# Patient Record
Sex: FEMALE | Race: WHITE | NOT HISPANIC OR LATINO | URBAN - METROPOLITAN AREA
[De-identification: names, ages, dates, MRNs, and addresses within clinical notes are randomized per-mention and may not be internally consistent; named-entity substitution may affect disease eponyms.]

---

## 2019-10-09 ENCOUNTER — INPATIENT (INPATIENT)
Facility: HOSPITAL | Age: 39
LOS: 3 days | Discharge: HOME | End: 2019-10-13
Attending: INTERNAL MEDICINE | Admitting: INTERNAL MEDICINE
Payer: MEDICAID

## 2019-10-09 VITALS
WEIGHT: 205.03 LBS | TEMPERATURE: 101 F | OXYGEN SATURATION: 97 % | HEART RATE: 112 BPM | SYSTOLIC BLOOD PRESSURE: 117 MMHG | HEIGHT: 62.99 IN | DIASTOLIC BLOOD PRESSURE: 61 MMHG | RESPIRATION RATE: 18 BRPM

## 2019-10-09 LAB
ALBUMIN SERPL ELPH-MCNC: 4.1 G/DL — SIGNIFICANT CHANGE UP (ref 3.5–5.2)
ALP SERPL-CCNC: 65 U/L — SIGNIFICANT CHANGE UP (ref 30–115)
ALT FLD-CCNC: 31 U/L — SIGNIFICANT CHANGE UP (ref 0–41)
ANION GAP SERPL CALC-SCNC: 13 MMOL/L — SIGNIFICANT CHANGE UP (ref 7–14)
APPEARANCE UR: ABNORMAL
AST SERPL-CCNC: 38 U/L — SIGNIFICANT CHANGE UP (ref 0–41)
BACTERIA # UR AUTO: ABNORMAL
BASOPHILS # BLD AUTO: 0.01 K/UL — SIGNIFICANT CHANGE UP (ref 0–0.2)
BASOPHILS NFR BLD AUTO: 0.1 % — SIGNIFICANT CHANGE UP (ref 0–1)
BILIRUB SERPL-MCNC: 2.7 MG/DL — HIGH (ref 0.2–1.2)
BILIRUB UR-MCNC: NEGATIVE — SIGNIFICANT CHANGE UP
BUN SERPL-MCNC: 9 MG/DL — LOW (ref 10–20)
CALCIUM SERPL-MCNC: 9.1 MG/DL — SIGNIFICANT CHANGE UP (ref 8.5–10.1)
CHLORIDE SERPL-SCNC: 96 MMOL/L — LOW (ref 98–110)
CK SERPL-CCNC: 65 U/L — SIGNIFICANT CHANGE UP (ref 0–225)
CO2 SERPL-SCNC: 24 MMOL/L — SIGNIFICANT CHANGE UP (ref 17–32)
COLOR SPEC: YELLOW — SIGNIFICANT CHANGE UP
CREAT SERPL-MCNC: 1 MG/DL — SIGNIFICANT CHANGE UP (ref 0.7–1.5)
DIFF PNL FLD: ABNORMAL
EOSINOPHIL # BLD AUTO: 0 K/UL — SIGNIFICANT CHANGE UP (ref 0–0.7)
EOSINOPHIL NFR BLD AUTO: 0 % — SIGNIFICANT CHANGE UP (ref 0–8)
EPI CELLS # UR: 7 /HPF — HIGH (ref 0–5)
FLU A RESULT: NEGATIVE — SIGNIFICANT CHANGE UP
FLU A RESULT: NEGATIVE — SIGNIFICANT CHANGE UP
FLUAV AG NPH QL: NEGATIVE — SIGNIFICANT CHANGE UP
FLUBV AG NPH QL: NEGATIVE — SIGNIFICANT CHANGE UP
GLUCOSE SERPL-MCNC: 118 MG/DL — HIGH (ref 70–99)
GLUCOSE UR QL: NEGATIVE — SIGNIFICANT CHANGE UP
HCG SERPL QL: NEGATIVE — SIGNIFICANT CHANGE UP
HCT VFR BLD CALC: 39.8 % — SIGNIFICANT CHANGE UP (ref 37–47)
HGB BLD-MCNC: 13.5 G/DL — SIGNIFICANT CHANGE UP (ref 12–16)
HYALINE CASTS # UR AUTO: 3 /LPF — SIGNIFICANT CHANGE UP (ref 0–7)
IMM GRANULOCYTES NFR BLD AUTO: 0.4 % — HIGH (ref 0.1–0.3)
KETONES UR-MCNC: ABNORMAL
LACTATE SERPL-SCNC: 1.3 MMOL/L — SIGNIFICANT CHANGE UP (ref 0.5–2.2)
LEUKOCYTE ESTERASE UR-ACNC: ABNORMAL
LIDOCAIN IGE QN: 18 U/L — SIGNIFICANT CHANGE UP (ref 7–60)
LYMPHOCYTES # BLD AUTO: 1.09 K/UL — LOW (ref 1.2–3.4)
LYMPHOCYTES # BLD AUTO: 7.4 % — LOW (ref 20.5–51.1)
MAGNESIUM SERPL-MCNC: 1.6 MG/DL — LOW (ref 1.8–2.4)
MCHC RBC-ENTMCNC: 30.2 PG — SIGNIFICANT CHANGE UP (ref 27–31)
MCHC RBC-ENTMCNC: 33.9 G/DL — SIGNIFICANT CHANGE UP (ref 32–37)
MCV RBC AUTO: 89 FL — SIGNIFICANT CHANGE UP (ref 81–99)
MONOCYTES # BLD AUTO: 1.27 K/UL — HIGH (ref 0.1–0.6)
MONOCYTES NFR BLD AUTO: 8.6 % — SIGNIFICANT CHANGE UP (ref 1.7–9.3)
NEUTROPHILS # BLD AUTO: 12.35 K/UL — HIGH (ref 1.4–6.5)
NEUTROPHILS NFR BLD AUTO: 83.5 % — HIGH (ref 42.2–75.2)
NITRITE UR-MCNC: POSITIVE
NRBC # BLD: 0 /100 WBCS — SIGNIFICANT CHANGE UP (ref 0–0)
PH UR: 6.5 — SIGNIFICANT CHANGE UP (ref 5–8)
PLATELET # BLD AUTO: 205 K/UL — SIGNIFICANT CHANGE UP (ref 130–400)
POTASSIUM SERPL-MCNC: 3.8 MMOL/L — SIGNIFICANT CHANGE UP (ref 3.5–5)
POTASSIUM SERPL-SCNC: 3.8 MMOL/L — SIGNIFICANT CHANGE UP (ref 3.5–5)
PROT SERPL-MCNC: 7.5 G/DL — SIGNIFICANT CHANGE UP (ref 6–8)
PROT UR-MCNC: ABNORMAL
RBC # BLD: 4.47 M/UL — SIGNIFICANT CHANGE UP (ref 4.2–5.4)
RBC # FLD: 13.2 % — SIGNIFICANT CHANGE UP (ref 11.5–14.5)
RBC CASTS # UR COMP ASSIST: 13 /HPF — HIGH (ref 0–4)
RSV RESULT: NEGATIVE — SIGNIFICANT CHANGE UP
RSV RNA RESP QL NAA+PROBE: NEGATIVE — SIGNIFICANT CHANGE UP
SODIUM SERPL-SCNC: 133 MMOL/L — LOW (ref 135–146)
SP GR SPEC: 1.01 — LOW (ref 1.01–1.02)
UROBILINOGEN FLD QL: SIGNIFICANT CHANGE UP
WBC # BLD: 14.78 K/UL — HIGH (ref 4.8–10.8)
WBC # FLD AUTO: 14.78 K/UL — HIGH (ref 4.8–10.8)
WBC UR QL: 157 /HPF — HIGH (ref 0–5)

## 2019-10-09 PROCEDURE — 71046 X-RAY EXAM CHEST 2 VIEWS: CPT | Mod: 26

## 2019-10-09 PROCEDURE — 74177 CT ABD & PELVIS W/CONTRAST: CPT | Mod: 26

## 2019-10-09 PROCEDURE — 99285 EMERGENCY DEPT VISIT HI MDM: CPT

## 2019-10-09 RX ORDER — SODIUM CHLORIDE 9 MG/ML
1000 INJECTION INTRAMUSCULAR; INTRAVENOUS; SUBCUTANEOUS ONCE
Refills: 0 | Status: COMPLETED | OUTPATIENT
Start: 2019-10-09 | End: 2019-10-09

## 2019-10-09 RX ORDER — CEFTRIAXONE 500 MG/1
1000 INJECTION, POWDER, FOR SOLUTION INTRAMUSCULAR; INTRAVENOUS EVERY 24 HOURS
Refills: 0 | Status: DISCONTINUED | OUTPATIENT
Start: 2019-10-09 | End: 2019-10-13

## 2019-10-09 RX ORDER — MAGNESIUM SULFATE 500 MG/ML
2 VIAL (ML) INJECTION ONCE
Refills: 0 | Status: COMPLETED | OUTPATIENT
Start: 2019-10-09 | End: 2019-10-09

## 2019-10-09 RX ORDER — ACETAMINOPHEN 500 MG
650 TABLET ORAL EVERY 6 HOURS
Refills: 0 | Status: DISCONTINUED | OUTPATIENT
Start: 2019-10-09 | End: 2019-10-10

## 2019-10-09 RX ORDER — ACETAMINOPHEN 500 MG
650 TABLET ORAL EVERY 6 HOURS
Refills: 0 | Status: DISCONTINUED | OUTPATIENT
Start: 2019-10-09 | End: 2019-10-13

## 2019-10-09 RX ORDER — IBUPROFEN 200 MG
600 TABLET ORAL EVERY 8 HOURS
Refills: 0 | Status: DISCONTINUED | OUTPATIENT
Start: 2019-10-09 | End: 2019-10-09

## 2019-10-09 RX ORDER — CHLORHEXIDINE GLUCONATE 213 G/1000ML
1 SOLUTION TOPICAL
Refills: 0 | Status: DISCONTINUED | OUTPATIENT
Start: 2019-10-09 | End: 2019-10-13

## 2019-10-09 RX ORDER — IBUPROFEN 200 MG
600 TABLET ORAL EVERY 8 HOURS
Refills: 0 | Status: DISCONTINUED | OUTPATIENT
Start: 2019-10-09 | End: 2019-10-13

## 2019-10-09 RX ORDER — KETOROLAC TROMETHAMINE 30 MG/ML
15 SYRINGE (ML) INJECTION ONCE
Refills: 0 | Status: DISCONTINUED | OUTPATIENT
Start: 2019-10-09 | End: 2019-10-09

## 2019-10-09 RX ORDER — SODIUM CHLORIDE 9 MG/ML
1000 INJECTION, SOLUTION INTRAVENOUS
Refills: 0 | Status: DISCONTINUED | OUTPATIENT
Start: 2019-10-09 | End: 2019-10-11

## 2019-10-09 RX ORDER — ONDANSETRON 8 MG/1
4 TABLET, FILM COATED ORAL ONCE
Refills: 0 | Status: COMPLETED | OUTPATIENT
Start: 2019-10-09 | End: 2019-10-09

## 2019-10-09 RX ORDER — CEFTRIAXONE 500 MG/1
1000 INJECTION, POWDER, FOR SOLUTION INTRAMUSCULAR; INTRAVENOUS ONCE
Refills: 0 | Status: COMPLETED | OUTPATIENT
Start: 2019-10-09 | End: 2019-10-09

## 2019-10-09 RX ORDER — ENOXAPARIN SODIUM 100 MG/ML
40 INJECTION SUBCUTANEOUS AT BEDTIME
Refills: 0 | Status: DISCONTINUED | OUTPATIENT
Start: 2019-10-09 | End: 2019-10-13

## 2019-10-09 RX ADMIN — CEFTRIAXONE 100 MILLIGRAM(S): 500 INJECTION, POWDER, FOR SOLUTION INTRAMUSCULAR; INTRAVENOUS at 17:31

## 2019-10-09 RX ADMIN — ENOXAPARIN SODIUM 40 MILLIGRAM(S): 100 INJECTION SUBCUTANEOUS at 21:37

## 2019-10-09 RX ADMIN — Medication 650 MILLIGRAM(S): at 20:14

## 2019-10-09 RX ADMIN — Medication 600 MILLIGRAM(S): at 21:36

## 2019-10-09 RX ADMIN — Medication 50 GRAM(S): at 16:57

## 2019-10-09 RX ADMIN — ONDANSETRON 4 MILLIGRAM(S): 8 TABLET, FILM COATED ORAL at 15:40

## 2019-10-09 RX ADMIN — Medication 50 GRAM(S): at 18:55

## 2019-10-09 RX ADMIN — SODIUM CHLORIDE 1000 MILLILITER(S): 9 INJECTION INTRAMUSCULAR; INTRAVENOUS; SUBCUTANEOUS at 17:32

## 2019-10-09 RX ADMIN — SODIUM CHLORIDE 2000 MILLILITER(S): 9 INJECTION INTRAMUSCULAR; INTRAVENOUS; SUBCUTANEOUS at 15:30

## 2019-10-09 RX ADMIN — SODIUM CHLORIDE 100 MILLILITER(S): 9 INJECTION, SOLUTION INTRAVENOUS at 20:13

## 2019-10-09 RX ADMIN — Medication 15 MILLIGRAM(S): at 15:40

## 2019-10-09 RX ADMIN — SODIUM CHLORIDE 100 MILLILITER(S): 9 INJECTION, SOLUTION INTRAVENOUS at 21:08

## 2019-10-09 NOTE — ED PROVIDER NOTE - CLINICAL SUMMARY MEDICAL DECISION MAKING FREE TEXT BOX
Pt in ER with 3 day h/o fever to 103, chills, back pain, dysuria, generalized malaise, vomiting, poor po intake for 2 days.  wbc 14K, lactate normal.  mag 1.6, given iv repletion. UA - + for UTI.  CT abd - + b/l pyelo.  Pt given IVF, IV abx, will admit for continued treatment as pt with poor po tolerance, visiting here for 2 months and doesn't have a PMD to f/u with.

## 2019-10-09 NOTE — H&P ADULT - ASSESSMENT
This is a 39 year old female w no significant pmhx presents to the ED for evaluation of fever and worsening body aches of 3 days duration. Yesterday she started developing mild dysuria and back pain. Also complaining of nausea and vomiting.    In the ED VS: T 100.9, , /57, sat 98%.  CT abdomen showed bilateral perinephric fat stranding, suspected to represent changes of ascending urinary tract infection.  She was given 2 L of NS and Iv ceftriaxone.    #sepsis on admission (fever, tachycardia, WBC) secondary to bilateral pyelonephritis  -s/p 2L of fluids and 1g of ceftriaxone in the ED  -U/A showed positive nitrites, large LE and 157 WBCs  -f/u urine and blood cx  -continue ceftriaxone 1g IV Q 24h  -IVF LR @ 100cc/hr  -tylenol PRN for fever and mild to moderate pain  -may give advil 600mg for severe pain    #Hypomagnesemia  likely related to vomiting  repleted in the ED  f/u BMP    #DVT ppx  lovenox    #GI ppx  not indicated    #diet: regular  activity: ambulate as tolerated  full code This is a 39 year old female w no significant pmhx presents to the ED for evaluation of fever and worsening body aches of 3 days duration. Yesterday she started developing mild dysuria and back pain. Also complaining of nausea and vomiting.    In the ED VS: T 100.9, , /57, sat 98%.  CT abdomen showed bilateral perinephric fat stranding, suspected to represent changes of ascending urinary tract infection.  She was given 2 L of NS and Iv ceftriaxone.    #sepsis on admission (fever, tachycardia, WBC) secondary to bilateral pyelonephritis  -s/p 2L of fluids and 1g of ceftriaxone in the ED  -U/A showed positive nitrites, large LE and 157 WBCs  -f/u urine and blood cx  -CXR neg, Flu A, B, RSV neg  -continue ceftriaxone 1g IV Q 24h  -IVF LR @ 100cc/hr  -tylenol PRN for fever and mild to moderate pain  -may give advil 600mg for severe pain    #Hypomagnesemia  likely related to vomiting  repleted in the ED  f/u BMP    #DVT ppx  lovenox    #GI ppx  not indicated    #diet: regular  activity: ambulate as tolerated  full code

## 2019-10-09 NOTE — H&P ADULT - NSHPLABSRESULTS_GEN_ALL_CORE
Labs:                        13.5   14.78 )-----------( 205      ( 09 Oct 2019 13:54 )             39.8             10-09    133<L>  |  96<L>  |  9<L>  ----------------------------<  118<H>  3.8   |  24  |  1.0    Ca    9.1      09 Oct 2019 13:54  Mg     1.6     10-09    TPro  7.5  /  Alb  4.1  /  TBili  2.7<H>  /  DBili  x   /  AST  38  /  ALT  31  /  AlkPhos  65  10-09    LIVER FUNCTIONS - ( 09 Oct 2019 13:54 )  Alb: 4.1 g/dL / Pro: 7.5 g/dL / ALK PHOS: 65 U/L / ALT: 31 U/L / AST: 38 U/L / GGT: x                   CARDIAC MARKERS ( 09 Oct 2019 13:54 )  x     / x     / 65 U/L / x     / x            Urinalysis Basic - ( 09 Oct 2019 13:54 )    Color: Yellow / Appearance: Slightly Turbid / S.007 / pH: x  Gluc: x / Ketone: Small  / Bili: Negative / Urobili: <2 mg/dL   Blood: x / Protein: 30 mg/dL / Nitrite: Positive   Leuk Esterase: Large / RBC: 13 /HPF /  /HPF   Sq Epi: x / Non Sq Epi: 7 /HPF / Bacteria: Many          Imaging:    < from: CT Abdomen and Pelvis w/ IV Cont (10.09.19 @ 16:48) >    Bilateral perinephric fat stranding, suspected to represent changes of   ascending urinary tract infection. Correlation with urinalysis   recommended.      < end of copied text >    < from: Xray Chest 2 Views PA/Lat (10.09.19 @ 14:20) >    No radiographic evidence of acute cardiopulmonary disease.    < end of copied text > Labs:                        13.5   14.78 )-----------( 205      ( 09 Oct 2019 13:54 )             39.8             10-09    133<L>  |  96<L>  |  9<L>  ----------------------------<  118<H>  3.8   |  24  |  1.0    Ca    9.1      09 Oct 2019 13:54  Mg     1.6     10-09    TPro  7.5  /  Alb  4.1  /  TBili  2.7<H>  /  DBili  x   /  AST  38  /  ALT  31  /  AlkPhos  65  10-09    LIVER FUNCTIONS - ( 09 Oct 2019 13:54 )  Alb: 4.1 g/dL / Pro: 7.5 g/dL / ALK PHOS: 65 U/L / ALT: 31 U/L / AST: 38 U/L / GGT: x                   CARDIAC MARKERS ( 09 Oct 2019 13:54 )  x     / x     / 65 U/L / x     / x            Urinalysis Basic - ( 09 Oct 2019 13:54 )    Color: Yellow / Appearance: Slightly Turbid / S.007 / pH: x  Gluc: x / Ketone: Small  / Bili: Negative / Urobili: <2 mg/dL   Blood: x / Protein: 30 mg/dL / Nitrite: Positive   Leuk Esterase: Large / RBC: 13 /HPF /  /HPF   Sq Epi: x / Non Sq Epi: 7 /HPF / Bacteria: Many      FLU A B RSV Detection by PCR (10.09.19 @ 13:54)    Flu A Result: Negative: Negative results do not preclude influenza infection and  should not be used as the sole basis for treatment or  other patient management decisions.  A positive result may occur in the absence of viable virus.  By: [employer] Xpert Flu viral assay by Reverse Transcriptase  Polymerase Chain Reaction (RT-PCR).    Flu B Result: Negative    RSV Result: Negative        Imaging:    < from: CT Abdomen and Pelvis w/ IV Cont (10.09.19 @ 16:48) >    Bilateral perinephric fat stranding, suspected to represent changes of   ascending urinary tract infection. Correlation with urinalysis   recommended.      < end of copied text >    < from: Xray Chest 2 Views PA/Lat (10.09.19 @ 14:20) >    No radiographic evidence of acute cardiopulmonary disease.    < end of copied text >

## 2019-10-09 NOTE — H&P ADULT - ATTENDING COMMENTS
This is a 39 year old female w no significant pmhx presents to the ED for evaluation of fever and worsening body aches of 3 days duration. Yesterday she started developing mild dysuria and back pain. Also complaining of nausea and vomiting.    In the ED VS: T 100.9, , /57, sat 98%.  CT abdomen showed bilateral perinephric fat stranding, suspected to represent changes of ascending urinary tract infection.  She was given 2 L of NS and Iv ceftriaxone.    #sepsis on admission (fever, tachycardia, WBC) secondary to bilateral pyelonephritis  -IV CTX  f/u Cxs.    #Hypomagnesemia  likely related to vomiting  repleted in the ED  f/u BMP    #DVT ppx  lovenox    #GI ppx  not indicated    #diet: regular  activity: ambulate as tolerated  full code

## 2019-10-09 NOTE — H&P ADULT - NSHPREVIEWOFSYSTEMS_GEN_ALL_CORE
CONSTITUTIONAL: (+) fevers, (-) chills, (-) fatigue, (-) unintentional weight loss, (+) decreased appetite  EYES: (-) vision changes, (-) blurry vision, (-) double vision, (-) eye pain, (-) eye redness, (-) eye discharge  ENT: (-) ear pain, (-) ear drainage, (-) congestion, (-) rhinorrhea, (-) sinus pain, (-) sore throat  NECK: (-) neck pain, (-) neck stiffness, (-) lymphadenopathy  CARDIO: (-) chest pain, (-) palpitations, (-) edema  PULM: (-) cough, (-) sputum, (-) shortness of breath  GI: (+) nausea, (-) vomiting, (-) diarrhea, (-) constipation, (-) abdominal pain, (-) melena, (-) hematochezia  : (-) dysuria, (-) hematuria, (-) frequency, (-) flank pain  MSK: (-) back pain, (-) arthralgias, (+) myalgias, (-) joint swelling, (-) joint stiffness  SKIN: (-) rashes, (-) wounds, (-) pallor  NEURO: (+) headache, (-) dizziness, (-) lightheadedness, (-) syncope, (-) weakness CONSTITUTIONAL: (+) fevers, (+) chills, (-) fatigue, (-) unintentional weight loss, (+) decreased appetite  EYES: (-) vision changes, (-) blurry vision, (-) double vision, (-) eye pain, (-) eye redness, (-) eye discharge  ENT: (-) ear pain, (-) ear drainage, (-) congestion, (-) rhinorrhea, (-) sinus pain, (-) sore throat  NECK: (-) neck pain, (-) neck stiffness, (-) lymphadenopathy  CARDIO: (-) chest pain, (-) palpitations, (-) edema  PULM: (-) cough, (-) sputum, (-) shortness of breath  GI: (+) nausea, (+) vomiting, (-) diarrhea, (-) constipation, (-) abdominal pain, (-) melena, (-) hematochezia  : (+) dysuria, (-) hematuria, (-) frequency, (-) flank pain  MSK: (+) back pain, (-) arthralgias, (+) myalgias, (-) joint swelling, (-) joint stiffness  SKIN: (-) rashes, (-) wounds, (-) pallor  NEURO: (+) headache, (-) dizziness, (-) lightheadedness, (-) syncope, (-) weakness

## 2019-10-09 NOTE — H&P ADULT - NSHPPHYSICALEXAM_GEN_ALL_CORE
Vital Signs Last 24 Hrs  T(C): 37.1 (09 Oct 2019 18:36), Max: 38.3 (09 Oct 2019 13:27)  T(F): 98.7 (09 Oct 2019 18:36), Max: 100.9 (09 Oct 2019 13:27)  HR: 82 (09 Oct 2019 18:36) (82 - 112)  BP: 110/57 (09 Oct 2019 18:36) (110/57 - 117/61)  BP(mean): --  RR: 18 (09 Oct 2019 18:36) (18 - 18)  SpO2: 98% (09 Oct 2019 18:36) (97% - 98%)    PHYSICAL EXAM:  GENERAL: NAD, well-developed  HEAD:  Atraumatic, Normocephalic  EYES: EOMI, PERRLA, anicteric sclera  NECK: Supple, No JVD  CHEST/LUNG: Clear to auscultation bilaterally; No wheeze  HEART: Regular rate and rhythm; No murmurs, rubs, or gallops  ABDOMEN: Soft, Nontender, Nondistended; Bowel sounds present  : No CVA tenderness  EXTREMITIES:  2+ Peripheral Pulses, No clubbing, cyanosis, or edema  PSYCH: AAOx3  NEUROLOGY: non-focal  SKIN: No rashes or lesions

## 2019-10-09 NOTE — ED PROVIDER NOTE - OBJECTIVE STATEMENT
39 year old female w no significant pmhx presents to the ED for evaluation of constant, progressively worsening body aches x 3 days. Patient initially had a b/l frontal throbbing headache which progressed to total body aches. Also endorses nausea, but no vomiting, and fevers t max 102.0F at home. Symptoms mildly improve with tylenol and advil at home. Denies congestion, rhinorrhea, sore throat, cough, chest pain, shortness of breath, vomiting, abd pain, diarrhea, rashes, irritative voiding symptoms, vaginal discharge. Patient moved here from [facility] one month ago, no known sick contacts or travel otherwise.

## 2019-10-09 NOTE — ED PROVIDER NOTE - PHYSICAL EXAMINATION
VITALS:  I have reviewed the initial vital signs.  GENERAL: Well-developed, well-nourished, in no acute distress. Nontoxic.  HEENT: Sclera clear. No conjunctival injection. EOMI, PERRLA. Mucous membranes moist, oropharynx nonerythematous without swelling or lesions. Tonsils 2+ bilaterally and w/o exudate. Uvula midline and without edema. Nasal turbinates clear and w/o discharge. No sinus ttp.  NECK: supple w FROM. No cervical adenopathy. No meningismus.   CARDIO: tachycardic, regular rhythm, nl S1 and S2. No murmurs, rubs, or gallops. No peripheral edema. 2+ radial and pedal pulses bilaterally.   PULM: Normal effort. CTA b/l without wheezes, rales, or rhonchi.  MSK: FROM to extremities x4. No joint swelling, erythema, or ttp.  GI: Normal bowel sounds. Abdomen soft and non-distended. Nontender in all four quadrants without rebound or guarding.  : No CVA tenderness b/l.  SKIN: Warm, dry. No pallor or rashes. Capillary refill <2 seconds.  NEURO: A&Ox3. Speech clear. CN II-XII intact. 5/5 strength to upper and lower extremities b/l. Sensation intact and equal throughout. No pronator drift. Finger to nose intact. Normal heel to shin.

## 2019-10-09 NOTE — ED ADULT NURSE NOTE - OBJECTIVE STATEMENT
pt presents to ed with c/o constant, progressively worsening body aches x 3 days. Also endorses nausea, but no vomiting, and fevers t max 102.0F at home. Denies congestion, rhinorrhea, sore throat, cough, chest pain, shortness of breath, vomiting, abd pain, diarrhea, rashes, irritative voiding symptoms, vaginal discharge. Patient moved here from [facility] one month ago, no known sick contacts or travel otherwise.

## 2019-10-09 NOTE — ED PROVIDER NOTE - NS ED ROS FT
CONSTITUTIONAL: (+) fevers, (-) chills, (-) fatigue, (-) unintentional weight loss, (+) decreased appetite  EYES: (-) vision changes, (-) blurry vision, (-) double vision, (-) eye pain, (-) eye redness, (-) eye discharge  ENT: (-) ear pain, (-) ear drainage, (-) congestion, (-) rhinorrhea, (-) sinus pain, (-) sore throat  NECK: (-) neck pain, (-) neck stiffness, (-) lymphadenopathy  CARDIO: (-) chest pain, (-) palpitations, (-) edema  PULM: (-) cough, (-) sputum, (-) shortness of breath  GI: (+) nausea, (-) vomiting, (-) diarrhea, (-) constipation, (-) abdominal pain, (-) melena, (-) hematochezia  : (-) dysuria, (-) hematuria, (-) frequency, (-) flank pain  MSK: (-) back pain, (-) arthralgias, (+) myalgias, (-) joint swelling, (-) joint stiffness  SKIN: (-) rashes, (-) wounds, (-) pallor  NEURO: (+) headache, (-) dizziness, (-) lightheadedness, (-) syncope, (-) weakness    *all other systems negative except as documented above and in the HPI*

## 2019-10-09 NOTE — ED PROVIDER NOTE - PROGRESS NOTE DETAILS
MAYKEL: patient feeling improved after IV fluids, toradol. patient does not have primary care, here visiting. will admit for IV antibiotics. MAR aware.

## 2019-10-09 NOTE — ED PROVIDER NOTE - ATTENDING CONTRIBUTION TO CARE
40 y/o female with no sig pmhx in ER with c/o not feeling well for the past 3 days.  + fever to 103, + chills.  + generalized malaise and not eating for 2 days.  intermittent frontal HA, no neck pain.  yesterday developed mid back pain and some dysuria.  no hematuria. +N/V.  No diarrhea.  cp/sob.  no cough.  no rash.  no le pain/swelling.   PE - nad, nc/at, eomi, perrl, op - clear, neck supple with from, cta b/l, no w/r/r, rrr, abd - soft, nt/nd, nabs, no cvat, from x 4, A&O x 3, no focal neuro deficits.  -check labs, cxr, ua, ivf, culture, ct abd.

## 2019-10-09 NOTE — H&P ADULT - HISTORY OF PRESENT ILLNESS
This is a 39 year old female w no significant pmhx presents to the ED for evaluation of fever and worsening body aches of 3 days duration. Patient initially had a b/l frontal throbbing headache which progressed to diffuse body aches. At home she was developing fever reaching 102 associated with chills. Also endorses nausea, but no vomiting. Symptoms mildly improved with tylenol and advil at home.   She denies congestion, rhinorrhea, sore throat, cough, chest pain, shortness of breath, vomiting, abd pain, diarrhea, rashes, irritative voiding symptoms, vaginal discharge. Patient moved here from [facility] one month ago, no known sick contacts or travel otherwise. This is a 39 year old female w no significant pmhx presents to the ED for evaluation of fever and worsening body aches of 3 days duration. Patient initially had a b/l frontal throbbing headache which progressed to diffuse body aches associated with fatigue and decreased appetite and PO intake. At home she was developing fever reaching 102 associated with chills. Also endorses nausea, but no vomiting. Yesterday she developed mild dysuria and back pain. Symptoms mildly improved with tylenol and advil at home.   She denies congestion, rhinorrhea, sore throat, cough, chest pain, shortness of breath, vomiting, abd pain, diarrhea, rashes, vaginal discharge. Patient moved here from [facility] one month ago, no known sick contacts or travel otherwise.    In the ED VS: T 100.9, , /57, sat 98%.  CT abdomen showed bilateral perinephric fat stranding, suspected to represent changes of ascending urinary tract infection.  She was given 2 L of NS and Iv ceftriaxone. This is a 39 year old female w no significant pmhx presents to the ED for evaluation of fever and worsening body aches of 3 days duration.  Her cousin at the bedside provided the translation as per the patient's request.  Patient initially had a b/l frontal throbbing headache which progressed to diffuse body aches associated with fatigue and decreased appetite/ PO intake. At home she was developing fever reaching 102 associated with chills. Also endorses nausea, and multiple episodes of NBNB vomiting but no abdominal pain. Yesterday she started developing mild dysuria and back pain. Symptoms mildly improved with tylenol and advil at home.   She denies congestion, rhinorrhea, sore throat, cough, chest pain, shortness of breath, abd pain, diarrhea, rashes, vaginal discharge. Patient moved here from [facility] one month ago, no known sick contacts or travel otherwise.    In the ED VS: T 100.9, , /57, sat 98%.  CT abdomen showed bilateral perinephric fat stranding, suspected to represent changes of ascending urinary tract infection.  She was given 2 L of NS and Iv ceftriaxone.

## 2019-10-10 LAB
ALBUMIN SERPL ELPH-MCNC: 3.3 G/DL — LOW (ref 3.5–5.2)
ALP SERPL-CCNC: 60 U/L — SIGNIFICANT CHANGE UP (ref 30–115)
ALT FLD-CCNC: 28 U/L — SIGNIFICANT CHANGE UP (ref 0–41)
ANION GAP SERPL CALC-SCNC: 12 MMOL/L — SIGNIFICANT CHANGE UP (ref 7–14)
AST SERPL-CCNC: 23 U/L — SIGNIFICANT CHANGE UP (ref 0–41)
BASOPHILS # BLD AUTO: 0.02 K/UL — SIGNIFICANT CHANGE UP (ref 0–0.2)
BASOPHILS NFR BLD AUTO: 0.1 % — SIGNIFICANT CHANGE UP (ref 0–1)
BILIRUB DIRECT SERPL-MCNC: 0.6 MG/DL — HIGH (ref 0–0.2)
BILIRUB INDIRECT FLD-MCNC: 1.5 MG/DL — HIGH (ref 0.2–1.2)
BILIRUB SERPL-MCNC: 2.1 MG/DL — HIGH (ref 0.2–1.2)
BUN SERPL-MCNC: 9 MG/DL — LOW (ref 10–20)
CALCIUM SERPL-MCNC: 8.2 MG/DL — LOW (ref 8.5–10.1)
CHLORIDE SERPL-SCNC: 106 MMOL/L — SIGNIFICANT CHANGE UP (ref 98–110)
CO2 SERPL-SCNC: 23 MMOL/L — SIGNIFICANT CHANGE UP (ref 17–32)
CREAT SERPL-MCNC: 0.9 MG/DL — SIGNIFICANT CHANGE UP (ref 0.7–1.5)
EOSINOPHIL # BLD AUTO: 0 K/UL — SIGNIFICANT CHANGE UP (ref 0–0.7)
EOSINOPHIL NFR BLD AUTO: 0 % — SIGNIFICANT CHANGE UP (ref 0–8)
GLUCOSE SERPL-MCNC: 141 MG/DL — HIGH (ref 70–99)
HCT VFR BLD CALC: 35.2 % — LOW (ref 37–47)
HGB BLD-MCNC: 11.6 G/DL — LOW (ref 12–16)
IMM GRANULOCYTES NFR BLD AUTO: 0.5 % — HIGH (ref 0.1–0.3)
LYMPHOCYTES # BLD AUTO: 1.42 K/UL — SIGNIFICANT CHANGE UP (ref 1.2–3.4)
LYMPHOCYTES # BLD AUTO: 7.7 % — LOW (ref 20.5–51.1)
MAGNESIUM SERPL-MCNC: 2.7 MG/DL — HIGH (ref 1.8–2.4)
MCHC RBC-ENTMCNC: 29.7 PG — SIGNIFICANT CHANGE UP (ref 27–31)
MCHC RBC-ENTMCNC: 33 G/DL — SIGNIFICANT CHANGE UP (ref 32–37)
MCV RBC AUTO: 90 FL — SIGNIFICANT CHANGE UP (ref 81–99)
MONOCYTES # BLD AUTO: 1.67 K/UL — HIGH (ref 0.1–0.6)
MONOCYTES NFR BLD AUTO: 9 % — SIGNIFICANT CHANGE UP (ref 1.7–9.3)
NEUTROPHILS # BLD AUTO: 15.3 K/UL — HIGH (ref 1.4–6.5)
NEUTROPHILS NFR BLD AUTO: 82.7 % — HIGH (ref 42.2–75.2)
NRBC # BLD: 0 /100 WBCS — SIGNIFICANT CHANGE UP (ref 0–0)
PLATELET # BLD AUTO: 175 K/UL — SIGNIFICANT CHANGE UP (ref 130–400)
POTASSIUM SERPL-MCNC: 4.7 MMOL/L — SIGNIFICANT CHANGE UP (ref 3.5–5)
POTASSIUM SERPL-SCNC: 4.7 MMOL/L — SIGNIFICANT CHANGE UP (ref 3.5–5)
PROT SERPL-MCNC: 6.1 G/DL — SIGNIFICANT CHANGE UP (ref 6–8)
RBC # BLD: 3.91 M/UL — LOW (ref 4.2–5.4)
RBC # FLD: 13.4 % — SIGNIFICANT CHANGE UP (ref 11.5–14.5)
SODIUM SERPL-SCNC: 141 MMOL/L — SIGNIFICANT CHANGE UP (ref 135–146)
WBC # BLD: 18.5 K/UL — HIGH (ref 4.8–10.8)
WBC # FLD AUTO: 18.5 K/UL — HIGH (ref 4.8–10.8)

## 2019-10-10 PROCEDURE — 99223 1ST HOSP IP/OBS HIGH 75: CPT | Mod: AI

## 2019-10-10 RX ORDER — ACETAMINOPHEN 500 MG
650 TABLET ORAL EVERY 6 HOURS
Refills: 0 | Status: DISCONTINUED | OUTPATIENT
Start: 2019-10-10 | End: 2019-10-13

## 2019-10-10 RX ORDER — ACETAMINOPHEN 500 MG
650 TABLET ORAL ONCE
Refills: 0 | Status: COMPLETED | OUTPATIENT
Start: 2019-10-10 | End: 2019-10-10

## 2019-10-10 RX ADMIN — SODIUM CHLORIDE 100 MILLILITER(S): 9 INJECTION, SOLUTION INTRAVENOUS at 07:00

## 2019-10-10 RX ADMIN — CEFTRIAXONE 100 MILLIGRAM(S): 500 INJECTION, POWDER, FOR SOLUTION INTRAMUSCULAR; INTRAVENOUS at 05:12

## 2019-10-10 RX ADMIN — SODIUM CHLORIDE 100 MILLILITER(S): 9 INJECTION, SOLUTION INTRAVENOUS at 17:44

## 2019-10-10 RX ADMIN — Medication 650 MILLIGRAM(S): at 18:56

## 2019-10-10 RX ADMIN — Medication 600 MILLIGRAM(S): at 00:31

## 2019-10-10 RX ADMIN — CHLORHEXIDINE GLUCONATE 1 APPLICATION(S): 213 SOLUTION TOPICAL at 05:12

## 2019-10-10 RX ADMIN — Medication 600 MILLIGRAM(S): at 12:00

## 2019-10-10 RX ADMIN — Medication 600 MILLIGRAM(S): at 11:21

## 2019-10-10 NOTE — PROGRESS NOTE ADULT - SUBJECTIVE AND OBJECTIVE BOX
LENGTH OF HOSPITAL STAY: 1d    CHIEF COMPLAINT:   Patient is a 39y old  Female who presents with a chief complaint of fever, body aches (09 Oct 2019 18:51)      Overnight events:    No acute events overnight    ALLERGIES:  eggs (Rash)  No Known Drug Allergies    MEDICATIONS:  STANDING MEDICATIONS  cefTRIAXone   IVPB 1000 milliGRAM(s) IV Intermittent every 24 hours  chlorhexidine 4% Liquid 1 Application(s) Topical <User Schedule>  enoxaparin Injectable 40 milliGRAM(s) SubCutaneous at bedtime  lactated ringers. 1000 milliLiter(s) IV Continuous <Continuous>    PRN MEDICATIONS  acetaminophen   Tablet .. 650 milliGRAM(s) Oral every 6 hours PRN  ibuprofen  Tablet. 600 milliGRAM(s) Oral every 8 hours PRN    VITALS:   T(F): 99.1  HR: 80  BP: 108/58  RR: 16  SpO2: 96%    LABS:                        11.6   18.50 )-----------( 175      ( 10 Oct 2019 05:22 )             35.2     10-10    141  |  106  |  9<L>  ----------------------------<  141<H>  4.7   |  23  |  0.9    Ca    8.2<L>      10 Oct 2019 05:22  Mg     2.7     10-10    TPro  6.1  /  Alb  3.3<L>  /  TBili  2.1<H>  /  DBili  0.6<H>  /  AST  23  /  ALT  28  /  AlkPhos  60  10-10      Urinalysis Basic - ( 09 Oct 2019 13:54 )    Color: Yellow / Appearance: Slightly Turbid / S.007 / pH: x  Gluc: x / Ketone: Small  / Bili: Negative / Urobili: <2 mg/dL   Blood: x / Protein: 30 mg/dL / Nitrite: Positive   Leuk Esterase: Large / RBC: 13 /HPF /  /HPF   Sq Epi: x / Non Sq Epi: 7 /HPF / Bacteria: Many            CARDIAC MARKERS ( 09 Oct 2019 13:54 )  x     / x     / 65 U/L / x     / x            Cultures:      RADIOLOGY:    PHYSICAL EXAM:  GEN: No acute distress  HEENT: atraumatic, normocephalic  LUNGS: Clear to auscultation bilaterally   HEART: S1/S2 present. RRR.   ABD: Soft, non-tender, non-distended. Bowel sounds present  EXT: non edematous, non erythematous  NEURO: AAOX3

## 2019-10-10 NOTE — DISCHARGE NOTE PROVIDER - HOSPITAL COURSE
This is a 39 year old female w no significant pmhx presents to the ED for evaluation of fever and worsening body aches of 3 days duration. Yesterday she started developing mild dysuria and back pain. Also complaining of nausea and vomiting and headache.    UA was +ve    Rocephin started. This is a 39 year old female w no significant pmhx presents to the ED for evaluation of fever and worsening body aches of 3 days duration. Yesterday she started developing mild dysuria and back pain. Also complaining of nausea and vomiting and headache. She was found to be in sepsis and on CT imaging b/l  perinephric fat stranding was shown, UA was +ve. Acute pyelonephritis wa suspected and after sending blood and urine cultures Rocephin started. blood Cx is negative and urine culture showed pansensitive E. coli. Otherwise she is stable. Cleared for discharge on antibiotics This is a 39 year old female w no significant pmhx presents to the ED for evaluation of fever and worsening body aches of 3 days duration. Yesterday she started developing mild dysuria and back pain. Also complaining of nausea and vomiting and headache. She was found to be in sepsis and on CT imaging b/l  perinephric fat stranding was shown, UA was +ve. Acute pyelonephritis wa suspected and after sending blood and urine cultures Rocephin started. blood Cx is negative and urine culture showed pansensitive E. coli. Otherwise she is stable. Cleared for discharge on antibiotics        Attending Note:    39 year old female w no significant pmhx presents to the ED for evaluation of fever and worsening body aches of 3 days duration. Yesterday she started developing mild dysuria and back pain. Also complaining of nausea and vomiting.        In the ED VS: T 100.9, , /57, sat 98%.    CT abdomen showed bilateral perinephric fat stranding, suspected to represent changes of ascending urinary tract infection.    She was given 2 L of NS and Iv ceftriaxone.        #sepsis on admission (fever, tachycardia, WBC) secondary to bilateral pyelonephritis    -IV CTX    Urine Cx growing pan sensitive E Coli.    D/c home on PO Cipro for 2 more days.

## 2019-10-10 NOTE — DISCHARGE NOTE PROVIDER - NSDCCPCAREPLAN_GEN_ALL_CORE_FT
PRINCIPAL DISCHARGE DIAGNOSIS  Diagnosis: Pyelonephritis  Assessment and Plan of Treatment: You were diagnosed with infection of kidneys. After giving antibiotics its improved now. Please take medicines regularly and follow up with your PMD PRINCIPAL DISCHARGE DIAGNOSIS  Diagnosis: Pyelonephritis  Assessment and Plan of Treatment: You were diagnosed with infection of kidneys. We started antibiotics its improved now. Please take medicines regularly and follow up with your PMD PRINCIPAL DISCHARGE DIAGNOSIS  Diagnosis: Pyelonephritis  Assessment and Plan of Treatment: You were diagnosed with infection of kidneys. We started antibiotics its improved now. Please take medicines regularly and follow up with your PMD. In case fever goes high or you feel blood in urine pleas come back to hospital PRINCIPAL DISCHARGE DIAGNOSIS  Diagnosis: Pyelonephritis  Assessment and Plan of Treatment: You were diagnosed with infection of kidneys. We started antibiotics its improved now. Complete Abx and follow up with your PMD. In case fever goes high or you feel blood in urine pleas come back to hospital.  May f/u with PMD at [facility] clinic in 1 week. Call the number for Dr. Luna

## 2019-10-10 NOTE — DISCHARGE NOTE PROVIDER - CARE PROVIDER_API CALL
Jose J Chairez)  Internal Medicine  [address], [address]  [place] [address]  Phone: [phone]  Fax: [phone]  Follow Up Time:

## 2019-10-10 NOTE — PROGRESS NOTE ADULT - ASSESSMENT
This is a 39 year old female w no significant pmhx presents to the ED for evaluation of fever and worsening body aches of 3 days duration. Yesterday she started developing mild dysuria and back pain. Also complaining of nausea and vomiting.    In the ED VS: T 100.9, , /57, sat 98%.  CT abdomen showed bilateral perinephric fat stranding, suspected to represent changes of ascending urinary tract infection.    #sepsis on admission (fever, tachycardia, WBC) secondary to bilateral pyelonephritis  -s/p 2L of fluids and 1g of ceftriaxone in the ED  -U/A showed positive  -f/u urine and blood cx  -CXR neg, Flu A, B, RSV neg  -continue ceftriaxone 1g IV Q 24h  -IVF LR @ 100cc/hr  -tylenol PRN for fever and mild to moderate pain  -may give advil 600mg for severe pain    #Hypomagnesemia  likely related to vomiting  repleted in the ED  f/u BMP    #DVT ppx  lovenox    #GI ppx  not indicated    #diet: regular  activity: ambulate as tolerated  full code

## 2019-10-11 LAB
-  AMIKACIN: SIGNIFICANT CHANGE UP
-  AMPICILLIN/SULBACTAM: SIGNIFICANT CHANGE UP
-  AMPICILLIN: SIGNIFICANT CHANGE UP
-  AZTREONAM: SIGNIFICANT CHANGE UP
-  CEFAZOLIN: SIGNIFICANT CHANGE UP
-  CEFEPIME: SIGNIFICANT CHANGE UP
-  CEFOXITIN: SIGNIFICANT CHANGE UP
-  CEFTRIAXONE: SIGNIFICANT CHANGE UP
-  CIPROFLOXACIN: SIGNIFICANT CHANGE UP
-  GENTAMICIN: SIGNIFICANT CHANGE UP
-  IMIPENEM: SIGNIFICANT CHANGE UP
-  LEVOFLOXACIN: SIGNIFICANT CHANGE UP
-  MEROPENEM: SIGNIFICANT CHANGE UP
-  NITROFURANTOIN: SIGNIFICANT CHANGE UP
-  PIPERACILLIN/TAZOBACTAM: SIGNIFICANT CHANGE UP
-  TIGECYCLINE: SIGNIFICANT CHANGE UP
-  TOBRAMYCIN: SIGNIFICANT CHANGE UP
-  TRIMETHOPRIM/SULFAMETHOXAZOLE: SIGNIFICANT CHANGE UP
ANION GAP SERPL CALC-SCNC: 9 MMOL/L — SIGNIFICANT CHANGE UP (ref 7–14)
BASOPHILS # BLD AUTO: 0.02 K/UL — SIGNIFICANT CHANGE UP (ref 0–0.2)
BASOPHILS NFR BLD AUTO: 0.2 % — SIGNIFICANT CHANGE UP (ref 0–1)
BUN SERPL-MCNC: 7 MG/DL — LOW (ref 10–20)
CALCIUM SERPL-MCNC: 8.7 MG/DL — SIGNIFICANT CHANGE UP (ref 8.5–10.1)
CHLORIDE SERPL-SCNC: 103 MMOL/L — SIGNIFICANT CHANGE UP (ref 98–110)
CO2 SERPL-SCNC: 27 MMOL/L — SIGNIFICANT CHANGE UP (ref 17–32)
CREAT SERPL-MCNC: 0.8 MG/DL — SIGNIFICANT CHANGE UP (ref 0.7–1.5)
CULTURE RESULTS: SIGNIFICANT CHANGE UP
EOSINOPHIL # BLD AUTO: 0.02 K/UL — SIGNIFICANT CHANGE UP (ref 0–0.7)
EOSINOPHIL NFR BLD AUTO: 0.2 % — SIGNIFICANT CHANGE UP (ref 0–8)
GLUCOSE SERPL-MCNC: 109 MG/DL — HIGH (ref 70–99)
HCT VFR BLD CALC: 33.7 % — LOW (ref 37–47)
HGB BLD-MCNC: 11.1 G/DL — LOW (ref 12–16)
IMM GRANULOCYTES NFR BLD AUTO: 0.5 % — HIGH (ref 0.1–0.3)
LYMPHOCYTES # BLD AUTO: 1.31 K/UL — SIGNIFICANT CHANGE UP (ref 1.2–3.4)
LYMPHOCYTES # BLD AUTO: 13.8 % — LOW (ref 20.5–51.1)
MAGNESIUM SERPL-MCNC: 2.1 MG/DL — SIGNIFICANT CHANGE UP (ref 1.8–2.4)
MCHC RBC-ENTMCNC: 29.7 PG — SIGNIFICANT CHANGE UP (ref 27–31)
MCHC RBC-ENTMCNC: 32.9 G/DL — SIGNIFICANT CHANGE UP (ref 32–37)
MCV RBC AUTO: 90.1 FL — SIGNIFICANT CHANGE UP (ref 81–99)
METHOD TYPE: SIGNIFICANT CHANGE UP
MONOCYTES # BLD AUTO: 1.09 K/UL — HIGH (ref 0.1–0.6)
MONOCYTES NFR BLD AUTO: 11.5 % — HIGH (ref 1.7–9.3)
NEUTROPHILS # BLD AUTO: 6.97 K/UL — HIGH (ref 1.4–6.5)
NEUTROPHILS NFR BLD AUTO: 73.8 % — SIGNIFICANT CHANGE UP (ref 42.2–75.2)
NRBC # BLD: 0 /100 WBCS — SIGNIFICANT CHANGE UP (ref 0–0)
ORGANISM # SPEC MICROSCOPIC CNT: SIGNIFICANT CHANGE UP
ORGANISM # SPEC MICROSCOPIC CNT: SIGNIFICANT CHANGE UP
PLATELET # BLD AUTO: 190 K/UL — SIGNIFICANT CHANGE UP (ref 130–400)
POTASSIUM SERPL-MCNC: 4.7 MMOL/L — SIGNIFICANT CHANGE UP (ref 3.5–5)
POTASSIUM SERPL-SCNC: 4.7 MMOL/L — SIGNIFICANT CHANGE UP (ref 3.5–5)
RBC # BLD: 3.74 M/UL — LOW (ref 4.2–5.4)
RBC # FLD: 13.8 % — SIGNIFICANT CHANGE UP (ref 11.5–14.5)
SODIUM SERPL-SCNC: 139 MMOL/L — SIGNIFICANT CHANGE UP (ref 135–146)
SPECIMEN SOURCE: SIGNIFICANT CHANGE UP
WBC # BLD: 9.46 K/UL — SIGNIFICANT CHANGE UP (ref 4.8–10.8)
WBC # FLD AUTO: 9.46 K/UL — SIGNIFICANT CHANGE UP (ref 4.8–10.8)

## 2019-10-11 PROCEDURE — 99232 SBSQ HOSP IP/OBS MODERATE 35: CPT

## 2019-10-11 RX ORDER — MAGNESIUM SULFATE 500 MG/ML
2 VIAL (ML) INJECTION ONCE
Refills: 0 | Status: COMPLETED | OUTPATIENT
Start: 2019-10-11 | End: 2019-10-11

## 2019-10-11 RX ORDER — IBUPROFEN 200 MG
600 TABLET ORAL ONCE
Refills: 0 | Status: COMPLETED | OUTPATIENT
Start: 2019-10-11 | End: 2019-10-11

## 2019-10-11 RX ADMIN — Medication 50 GRAM(S): at 11:48

## 2019-10-11 RX ADMIN — Medication 650 MILLIGRAM(S): at 11:48

## 2019-10-11 RX ADMIN — Medication 650 MILLIGRAM(S): at 02:39

## 2019-10-11 RX ADMIN — Medication 650 MILLIGRAM(S): at 04:23

## 2019-10-11 RX ADMIN — Medication 600 MILLIGRAM(S): at 15:28

## 2019-10-11 RX ADMIN — Medication 650 MILLIGRAM(S): at 23:43

## 2019-10-11 RX ADMIN — CEFTRIAXONE 100 MILLIGRAM(S): 500 INJECTION, POWDER, FOR SOLUTION INTRAMUSCULAR; INTRAVENOUS at 05:19

## 2019-10-11 RX ADMIN — Medication 600 MILLIGRAM(S): at 14:18

## 2019-10-11 RX ADMIN — Medication 650 MILLIGRAM(S): at 04:53

## 2019-10-11 RX ADMIN — Medication 650 MILLIGRAM(S): at 14:00

## 2019-10-11 NOTE — PROGRESS NOTE ADULT - SUBJECTIVE AND OBJECTIVE BOX
LENGTH OF HOSPITAL STAY: 2d    CHIEF COMPLAINT:   Patient is a 39y old  Female who presents with a chief complaint of fever, body aches (10 Oct 2019 15:58)      Overnight events:    Was febrile. Gave tyelenol    ALLERGIES:  eggs (Rash)  No Known Drug Allergies    MEDICATIONS:  STANDING MEDICATIONS  cefTRIAXone   IVPB 1000 milliGRAM(s) IV Intermittent every 24 hours  chlorhexidine 4% Liquid 1 Application(s) Topical <User Schedule>  enoxaparin Injectable 40 milliGRAM(s) SubCutaneous at bedtime    PRN MEDICATIONS  acetaminophen   Tablet .. 650 milliGRAM(s) Oral every 6 hours PRN  acetaminophen   Tablet .. 650 milliGRAM(s) Oral every 6 hours PRN  ibuprofen  Tablet. 600 milliGRAM(s) Oral every 8 hours PRN    VITALS:   T(F): 100.4  HR: 88  BP: 107/57  RR: 18  SpO2: --    LABS:                        11.1   9.46  )-----------( 190      ( 11 Oct 2019 07:02 )             33.7     10-11    139  |  103  |  7<L>  ----------------------------<  109<H>  4.7   |  27  |  0.8    Ca    8.7      11 Oct 2019 07:02  Mg     2.1     10-11    TPro  6.1  /  Alb  3.3<L>  /  TBili  2.1<H>  /  DBili  0.6<H>  /  AST  23  /  ALT  28  /  AlkPhos  60  10-10      Urinalysis Basic - ( 09 Oct 2019 13:54 )    Color: Yellow / Appearance: Slightly Turbid / S.007 / pH: x  Gluc: x / Ketone: Small  / Bili: Negative / Urobili: <2 mg/dL   Blood: x / Protein: 30 mg/dL / Nitrite: Positive   Leuk Esterase: Large / RBC: 13 /HPF /  /HPF   Sq Epi: x / Non Sq Epi: 7 /HPF / Bacteria: Many            Culture - Blood (collected 09 Oct 2019 20:09)  Source: .Blood None  Preliminary Report (11 Oct 2019 04:01):    No growth to date.    Culture - Urine (collected 09 Oct 2019 13:54)  Source: .Urine Clean Catch (Midstream)  Preliminary Report (10 Oct 2019 17:37):    >100,000 CFU/ml Gram Negative Rods      CARDIAC MARKERS ( 09 Oct 2019 13:54 )  x     / x     / 65 U/L / x     / x            Cultures:    Culture - Blood (collected 09 Oct 2019 20:09)  Source: .Blood None  Preliminary Report (11 Oct 2019 04:01):    No growth to date.    Culture - Urine (collected 09 Oct 2019 13:54)  Source: .Urine Clean Catch (Midstream)  Preliminary Report (10 Oct 2019 17:37):    >100,000 CFU/ml Gram Negative Rods        RADIOLOGY:    PHYSICAL EXAM:  GEN: was febrle  HEENT: atraumatic, normocehalic  LUNGS: Clear to auscultation bilaterally   HEART: S1/S2 present. RRR.   ABD: Soft, non-tender, non-distended. Bowel sounds present  EXT: non edematous, non erythematous  NEURO: AAOX3

## 2019-10-11 NOTE — PROGRESS NOTE ADULT - ASSESSMENT
This is a 39 year old female w no significant pmhx presents to the ED for evaluation of fever and worsening body aches of 3 days duration. Yesterday she started developing mild dysuria and back pain. Also complaining of nausea and vomiting.    In the ED VS: T 100.9, , /57, sat 98%.  CT abdomen showed bilateral perinephric fat stranding, suspected to represent changes of ascending urinary tract infection.    #sepsis on admission (fever, tachycardia, WBC) secondary to bilateral pyelonephritis  -s/p 2L of fluids and 1g of ceftriaxone in the ED  -U/A showed positive  -Blood Cx negative  - Urine cx positive for GN kody  -CXR neg, Flu A, B, RSV neg  -continue ceftriaxone 1g IV Q 24h  -IVF LR @ 100cc/hr  -tylenol PRN for fever and mild to moderate pain  -may give advil 600mg for severe pain    #Hypomagnesemia  likely related to vomiting  repleted today  f/u am    #DVT ppx  lovenox    #GI ppx  not indicated    #diet: regular  activity: ambulate as tolerated  full code

## 2019-10-11 NOTE — PROGRESS NOTE ADULT - ATTENDING COMMENTS
39 year old female w no significant pmhx presents to the ED for evaluation of fever and worsening body aches of 3 days duration. Yesterday she started developing mild dysuria and back pain. Also complaining of nausea and vomiting.    In the ED VS: T 100.9, , /57, sat 98%.  CT abdomen showed bilateral perinephric fat stranding, suspected to represent changes of ascending urinary tract infection.  She was given 2 L of NS and Iv ceftriaxone.    #sepsis on admission (fever, tachycardia, WBC) secondary to bilateral pyelonephritis  -IV CTX  Urine Cx growing pan sensitive E Coli.  D/c home on POAbx once afebrile for 24 hours.      #Hypomagnesemia  likely related to vomiting  repleted in the ED  f/u BMP    #DVT ppx  lovenox    #GI ppx  not indicated    #diet: regular  activity: ambulate as tolerated  full code .

## 2019-10-12 LAB
ANION GAP SERPL CALC-SCNC: 13 MMOL/L — SIGNIFICANT CHANGE UP (ref 7–14)
BASOPHILS # BLD AUTO: 0.02 K/UL — SIGNIFICANT CHANGE UP (ref 0–0.2)
BASOPHILS NFR BLD AUTO: 0.3 % — SIGNIFICANT CHANGE UP (ref 0–1)
BUN SERPL-MCNC: 6 MG/DL — LOW (ref 10–20)
CALCIUM SERPL-MCNC: 9 MG/DL — SIGNIFICANT CHANGE UP (ref 8.5–10.1)
CHLORIDE SERPL-SCNC: 101 MMOL/L — SIGNIFICANT CHANGE UP (ref 98–110)
CO2 SERPL-SCNC: 25 MMOL/L — SIGNIFICANT CHANGE UP (ref 17–32)
CREAT SERPL-MCNC: 0.8 MG/DL — SIGNIFICANT CHANGE UP (ref 0.7–1.5)
EOSINOPHIL # BLD AUTO: 0.05 K/UL — SIGNIFICANT CHANGE UP (ref 0–0.7)
EOSINOPHIL NFR BLD AUTO: 0.8 % — SIGNIFICANT CHANGE UP (ref 0–8)
GLUCOSE SERPL-MCNC: 95 MG/DL — SIGNIFICANT CHANGE UP (ref 70–99)
HCT VFR BLD CALC: 36.8 % — LOW (ref 37–47)
HGB BLD-MCNC: 11.9 G/DL — LOW (ref 12–16)
IMM GRANULOCYTES NFR BLD AUTO: 0.6 % — HIGH (ref 0.1–0.3)
LYMPHOCYTES # BLD AUTO: 1.64 K/UL — SIGNIFICANT CHANGE UP (ref 1.2–3.4)
LYMPHOCYTES # BLD AUTO: 24.9 % — SIGNIFICANT CHANGE UP (ref 20.5–51.1)
MAGNESIUM SERPL-MCNC: 2.1 MG/DL — SIGNIFICANT CHANGE UP (ref 1.8–2.4)
MCHC RBC-ENTMCNC: 29.2 PG — SIGNIFICANT CHANGE UP (ref 27–31)
MCHC RBC-ENTMCNC: 32.3 G/DL — SIGNIFICANT CHANGE UP (ref 32–37)
MCV RBC AUTO: 90.2 FL — SIGNIFICANT CHANGE UP (ref 81–99)
MONOCYTES # BLD AUTO: 0.79 K/UL — HIGH (ref 0.1–0.6)
MONOCYTES NFR BLD AUTO: 12 % — HIGH (ref 1.7–9.3)
NEUTROPHILS # BLD AUTO: 4.04 K/UL — SIGNIFICANT CHANGE UP (ref 1.4–6.5)
NEUTROPHILS NFR BLD AUTO: 61.4 % — SIGNIFICANT CHANGE UP (ref 42.2–75.2)
NRBC # BLD: 0 /100 WBCS — SIGNIFICANT CHANGE UP (ref 0–0)
PLATELET # BLD AUTO: 203 K/UL — SIGNIFICANT CHANGE UP (ref 130–400)
POTASSIUM SERPL-MCNC: 4.5 MMOL/L — SIGNIFICANT CHANGE UP (ref 3.5–5)
POTASSIUM SERPL-SCNC: 4.5 MMOL/L — SIGNIFICANT CHANGE UP (ref 3.5–5)
RBC # BLD: 4.08 M/UL — LOW (ref 4.2–5.4)
RBC # FLD: 14.1 % — SIGNIFICANT CHANGE UP (ref 11.5–14.5)
SODIUM SERPL-SCNC: 139 MMOL/L — SIGNIFICANT CHANGE UP (ref 135–146)
WBC # BLD: 6.58 K/UL — SIGNIFICANT CHANGE UP (ref 4.8–10.8)
WBC # FLD AUTO: 6.58 K/UL — SIGNIFICANT CHANGE UP (ref 4.8–10.8)

## 2019-10-12 PROCEDURE — 99232 SBSQ HOSP IP/OBS MODERATE 35: CPT

## 2019-10-12 RX ADMIN — CEFTRIAXONE 100 MILLIGRAM(S): 500 INJECTION, POWDER, FOR SOLUTION INTRAMUSCULAR; INTRAVENOUS at 06:01

## 2019-10-12 RX ADMIN — Medication 650 MILLIGRAM(S): at 00:13

## 2019-10-12 NOTE — PROGRESS NOTE ADULT - ASSESSMENT
This is a 39 year old female w no significant pmhx presents to the ED for evaluation of fever and worsening body aches of 3 days duration. Yesterday she started developing mild dysuria and back pain. Also complaining of nausea and vomiting.    In the ED VS: T 100.9, , /57, sat 98%.  CT abdomen showed bilateral perinephric fat stranding, suspected to represent changes of ascending urinary tract infection.    #sepsis on admission (fever, tachycardia, WBC) secondary to bilateral pyelonephritis  -s/p 2L of fluids and 1g of ceftriaxone in the ED  -U/A showed positive  -Blood Cx negative  - Urine cx positive for GN kody  -CXR neg, Flu A, B, RSV neg  -continue ceftriaxone 1g IV Q 24h  -IVF LR @ 100cc/hr  -tylenol PRN for fever and mild to moderate pain  -may give advil 600mg for severe pain    #Hypomagnesemia  likely related to vomiting  repleted today  f/u am    #DVT ppx  lovenox    #GI ppx  not indicated    #diet: regular  activity: ambulate as tolerated  full

## 2019-10-12 NOTE — PROGRESS NOTE ADULT - SUBJECTIVE AND OBJECTIVE BOX
LENGTH OF HOSPITAL STAY: 3d    CHIEF COMPLAINT:   Patient is a 39y old  Female who presents with a chief complaint of fever, body aches (11 Oct 2019 10:00)      Overnight events:    No acute events overnight    ALLERGIES:  eggs (Rash)  No Known Drug Allergies    MEDICATIONS:  STANDING MEDICATIONS  cefTRIAXone   IVPB 1000 milliGRAM(s) IV Intermittent every 24 hours  chlorhexidine 4% Liquid 1 Application(s) Topical <User Schedule>  enoxaparin Injectable 40 milliGRAM(s) SubCutaneous at bedtime    PRN MEDICATIONS  acetaminophen   Tablet .. 650 milliGRAM(s) Oral every 6 hours PRN  acetaminophen   Tablet .. 650 milliGRAM(s) Oral every 6 hours PRN  ibuprofen  Tablet. 600 milliGRAM(s) Oral every 8 hours PRN    VITALS:   T(F): 99  HR: 75  BP: 102/57  RR: 18  SpO2: 97%    LABS:                        11.9   6.58  )-----------( 203      ( 12 Oct 2019 06:01 )             36.8     10-12    139  |  101  |  6<L>  ----------------------------<  95  4.5   |  25  |  0.8    Ca    9.0      12 Oct 2019 06:01  Mg     2.1     10-12                Culture - Blood (collected 09 Oct 2019 20:09)  Source: .Blood None  Preliminary Report (11 Oct 2019 04:01):    No growth to date.    Culture - Urine (collected 09 Oct 2019 13:54)  Source: .Urine Clean Catch (Midstream)  Final Report (11 Oct 2019 16:35):    >100,000 CFU/ml Escherichia coli  Organism: Escherichia coli (11 Oct 2019 16:35)  Organism: Escherichia coli (11 Oct 2019 16:35)            Cultures:    Culture - Blood (collected 09 Oct 2019 20:09)  Source: .Blood None  Preliminary Report (11 Oct 2019 04:01):    No growth to date.    Culture - Urine (collected 09 Oct 2019 13:54)  Source: .Urine Clean Catch (Midstream)  Final Report (11 Oct 2019 16:35):    >100,000 CFU/ml Escherichia coli  Organism: Escherichia coli (11 Oct 2019 16:35)  Organism: Escherichia coli (11 Oct 2019 16:35)        RADIOLOGY:    PHYSICAL EXAM:  GEN: No acute distress  HEENT:   LUNGS: Clear to auscultation bilaterally   HEART: S1/S2 present. RRR.   ABD: Soft, non-tender, non-distended. Bowel sounds present  EXT:  NEURO: AAOX3

## 2019-10-12 NOTE — PROGRESS NOTE ADULT - ATTENDING COMMENTS
39 year old female w no significant pmhx presents to the ED for evaluation of fever and worsening body aches of 3 days duration. Yesterday she started developing mild dysuria and back pain. Also complaining of nausea and vomiting.    In the ED VS: T 100.9, , /57, sat 98%.  CT abdomen showed bilateral perinephric fat stranding, suspected to represent changes of ascending urinary tract infection.  She was given 2 L of NS and Iv ceftriaxone.    #sepsis on admission (fever, tachycardia, WBC) secondary to bilateral pyelonephritis  -IV CTX  Urine Cx growing pan sensitive E Coli.  D/c home on POAbx once afebrile for 24 hours.  Anticipate for tomorrow.      #Hypomagnesemia  likely related to vomiting  repleted in the ED  f/u BMP    #DVT ppx  lovenox    #GI ppx  not indicated    #diet: regular  activity: ambulate as tolerated  full code .

## 2019-10-13 VITALS
HEART RATE: 61 BPM | TEMPERATURE: 97 F | RESPIRATION RATE: 18 BRPM | SYSTOLIC BLOOD PRESSURE: 106 MMHG | DIASTOLIC BLOOD PRESSURE: 68 MMHG

## 2019-10-13 LAB
ANION GAP SERPL CALC-SCNC: 14 MMOL/L — SIGNIFICANT CHANGE UP (ref 7–14)
BASOPHILS # BLD AUTO: 0.02 K/UL — SIGNIFICANT CHANGE UP (ref 0–0.2)
BASOPHILS NFR BLD AUTO: 0.3 % — SIGNIFICANT CHANGE UP (ref 0–1)
BUN SERPL-MCNC: 9 MG/DL — LOW (ref 10–20)
CALCIUM SERPL-MCNC: 9.1 MG/DL — SIGNIFICANT CHANGE UP (ref 8.5–10.1)
CHLORIDE SERPL-SCNC: 101 MMOL/L — SIGNIFICANT CHANGE UP (ref 98–110)
CO2 SERPL-SCNC: 25 MMOL/L — SIGNIFICANT CHANGE UP (ref 17–32)
CREAT SERPL-MCNC: 0.8 MG/DL — SIGNIFICANT CHANGE UP (ref 0.7–1.5)
EOSINOPHIL # BLD AUTO: 0.1 K/UL — SIGNIFICANT CHANGE UP (ref 0–0.7)
EOSINOPHIL NFR BLD AUTO: 1.3 % — SIGNIFICANT CHANGE UP (ref 0–8)
GLUCOSE SERPL-MCNC: 93 MG/DL — SIGNIFICANT CHANGE UP (ref 70–99)
HCT VFR BLD CALC: 35.2 % — LOW (ref 37–47)
HGB BLD-MCNC: 11.6 G/DL — LOW (ref 12–16)
IMM GRANULOCYTES NFR BLD AUTO: 1 % — HIGH (ref 0.1–0.3)
LYMPHOCYTES # BLD AUTO: 2.18 K/UL — SIGNIFICANT CHANGE UP (ref 1.2–3.4)
LYMPHOCYTES # BLD AUTO: 28.3 % — SIGNIFICANT CHANGE UP (ref 20.5–51.1)
MAGNESIUM SERPL-MCNC: 2.2 MG/DL — SIGNIFICANT CHANGE UP (ref 1.8–2.4)
MCHC RBC-ENTMCNC: 29.4 PG — SIGNIFICANT CHANGE UP (ref 27–31)
MCHC RBC-ENTMCNC: 33 G/DL — SIGNIFICANT CHANGE UP (ref 32–37)
MCV RBC AUTO: 89.3 FL — SIGNIFICANT CHANGE UP (ref 81–99)
MONOCYTES # BLD AUTO: 1.06 K/UL — HIGH (ref 0.1–0.6)
MONOCYTES NFR BLD AUTO: 13.8 % — HIGH (ref 1.7–9.3)
NEUTROPHILS # BLD AUTO: 4.25 K/UL — SIGNIFICANT CHANGE UP (ref 1.4–6.5)
NEUTROPHILS NFR BLD AUTO: 55.3 % — SIGNIFICANT CHANGE UP (ref 42.2–75.2)
NRBC # BLD: 0 /100 WBCS — SIGNIFICANT CHANGE UP (ref 0–0)
PLATELET # BLD AUTO: 228 K/UL — SIGNIFICANT CHANGE UP (ref 130–400)
POTASSIUM SERPL-MCNC: 4.8 MMOL/L — SIGNIFICANT CHANGE UP (ref 3.5–5)
POTASSIUM SERPL-SCNC: 4.8 MMOL/L — SIGNIFICANT CHANGE UP (ref 3.5–5)
RBC # BLD: 3.94 M/UL — LOW (ref 4.2–5.4)
RBC # FLD: 14.3 % — SIGNIFICANT CHANGE UP (ref 11.5–14.5)
SODIUM SERPL-SCNC: 140 MMOL/L — SIGNIFICANT CHANGE UP (ref 135–146)
WBC # BLD: 7.69 K/UL — SIGNIFICANT CHANGE UP (ref 4.8–10.8)
WBC # FLD AUTO: 7.69 K/UL — SIGNIFICANT CHANGE UP (ref 4.8–10.8)

## 2019-10-13 PROCEDURE — 99239 HOSP IP/OBS DSCHRG MGMT >30: CPT

## 2019-10-13 RX ORDER — MOXIFLOXACIN HYDROCHLORIDE TABLETS, 400 MG 400 MG/1
1 TABLET, FILM COATED ORAL
Qty: 4 | Refills: 0
Start: 2019-10-13

## 2019-10-13 NOTE — DISCHARGE NOTE NURSING/CASE MANAGEMENT/SOCIAL WORK - PATIENT PORTAL LINK FT
You can access the FollowMyHealth Patient Portal offered by [facility] by registering at the following website: [email or web address]. By joining [employer]’s FollowMyHealth portal, you will also be able to view your health information using other applications (apps) compatible with our system.

## 2019-10-15 LAB
CULTURE RESULTS: SIGNIFICANT CHANGE UP
SPECIMEN SOURCE: SIGNIFICANT CHANGE UP

## 2019-10-17 DIAGNOSIS — A41.9 SEPSIS, UNSPECIFIED ORGANISM: ICD-10-CM

## 2019-10-17 DIAGNOSIS — N10 ACUTE PYELONEPHRITIS: ICD-10-CM

## 2019-10-17 DIAGNOSIS — E83.42 HYPOMAGNESEMIA: ICD-10-CM

## 2019-10-17 DIAGNOSIS — R10.9 UNSPECIFIED ABDOMINAL PAIN: ICD-10-CM

## 2019-10-17 LAB
CULTURE RESULTS: SIGNIFICANT CHANGE UP
SPECIMEN SOURCE: SIGNIFICANT CHANGE UP

## 2020-01-28 ENCOUNTER — EMERGENCY (EMERGENCY)
Facility: HOSPITAL | Age: 40
LOS: 0 days | Discharge: HOME | End: 2020-01-28
Attending: EMERGENCY MEDICINE | Admitting: EMERGENCY MEDICINE
Payer: MEDICAID

## 2020-01-28 VITALS
SYSTOLIC BLOOD PRESSURE: 117 MMHG | RESPIRATION RATE: 18 BRPM | OXYGEN SATURATION: 97 % | HEART RATE: 81 BPM | DIASTOLIC BLOOD PRESSURE: 71 MMHG | TEMPERATURE: 99 F

## 2020-01-28 VITALS
SYSTOLIC BLOOD PRESSURE: 122 MMHG | HEART RATE: 108 BPM | TEMPERATURE: 103 F | DIASTOLIC BLOOD PRESSURE: 75 MMHG | RESPIRATION RATE: 18 BRPM | OXYGEN SATURATION: 97 %

## 2020-01-28 DIAGNOSIS — Z91.012 ALLERGY TO EGGS: ICD-10-CM

## 2020-01-28 DIAGNOSIS — R50.9 FEVER, UNSPECIFIED: ICD-10-CM

## 2020-01-28 DIAGNOSIS — R00.0 TACHYCARDIA, UNSPECIFIED: ICD-10-CM

## 2020-01-28 DIAGNOSIS — N39.0 URINARY TRACT INFECTION, SITE NOT SPECIFIED: ICD-10-CM

## 2020-01-28 DIAGNOSIS — M54.9 DORSALGIA, UNSPECIFIED: ICD-10-CM

## 2020-01-28 DIAGNOSIS — M54.5 LOW BACK PAIN: ICD-10-CM

## 2020-01-28 DIAGNOSIS — L98.9 DISORDER OF THE SKIN AND SUBCUTANEOUS TISSUE, UNSPECIFIED: ICD-10-CM

## 2020-01-28 LAB
ALBUMIN SERPL ELPH-MCNC: 4.6 G/DL — SIGNIFICANT CHANGE UP (ref 3.5–5.2)
ALP SERPL-CCNC: 59 U/L — SIGNIFICANT CHANGE UP (ref 30–115)
ALT FLD-CCNC: 15 U/L — SIGNIFICANT CHANGE UP (ref 0–41)
ANION GAP SERPL CALC-SCNC: 18 MMOL/L — HIGH (ref 7–14)
APPEARANCE UR: CLEAR — SIGNIFICANT CHANGE UP
AST SERPL-CCNC: 24 U/L — SIGNIFICANT CHANGE UP (ref 0–41)
BACTERIA # UR AUTO: ABNORMAL
BASE EXCESS BLDV CALC-SCNC: 1 MMOL/L — SIGNIFICANT CHANGE UP (ref -2–2)
BASOPHILS # BLD AUTO: 0.02 K/UL — SIGNIFICANT CHANGE UP (ref 0–0.2)
BASOPHILS NFR BLD AUTO: 0.3 % — SIGNIFICANT CHANGE UP (ref 0–1)
BILIRUB SERPL-MCNC: 0.6 MG/DL — SIGNIFICANT CHANGE UP (ref 0.2–1.2)
BILIRUB UR-MCNC: NEGATIVE — SIGNIFICANT CHANGE UP
BUN SERPL-MCNC: 9 MG/DL — LOW (ref 10–20)
CA-I SERPL-SCNC: 1.2 MMOL/L — SIGNIFICANT CHANGE UP (ref 1.12–1.3)
CALCIUM SERPL-MCNC: 9.3 MG/DL — SIGNIFICANT CHANGE UP (ref 8.5–10.1)
CHLORIDE SERPL-SCNC: 98 MMOL/L — SIGNIFICANT CHANGE UP (ref 98–110)
CO2 SERPL-SCNC: 20 MMOL/L — SIGNIFICANT CHANGE UP (ref 17–32)
COLOR SPEC: YELLOW — SIGNIFICANT CHANGE UP
CREAT SERPL-MCNC: 0.9 MG/DL — SIGNIFICANT CHANGE UP (ref 0.7–1.5)
DIFF PNL FLD: ABNORMAL
EOSINOPHIL # BLD AUTO: 0.02 K/UL — SIGNIFICANT CHANGE UP (ref 0–0.7)
EOSINOPHIL NFR BLD AUTO: 0.3 % — SIGNIFICANT CHANGE UP (ref 0–8)
EPI CELLS # UR: 4 /HPF — SIGNIFICANT CHANGE UP (ref 0–5)
GAS PNL BLDV: 139 MMOL/L — SIGNIFICANT CHANGE UP (ref 136–145)
GAS PNL BLDV: SIGNIFICANT CHANGE UP
GLUCOSE SERPL-MCNC: 110 MG/DL — HIGH (ref 70–99)
GLUCOSE UR QL: NEGATIVE — SIGNIFICANT CHANGE UP
HCG SERPL QL: NEGATIVE — SIGNIFICANT CHANGE UP
HCO3 BLDV-SCNC: 26 MMOL/L — SIGNIFICANT CHANGE UP (ref 22–29)
HCT VFR BLD CALC: 42.1 % — SIGNIFICANT CHANGE UP (ref 37–47)
HCT VFR BLDA CALC: 40.9 % — SIGNIFICANT CHANGE UP (ref 34–44)
HGB BLD CALC-MCNC: 13.4 G/DL — LOW (ref 14–18)
HGB BLD-MCNC: 14.2 G/DL — SIGNIFICANT CHANGE UP (ref 12–16)
HYALINE CASTS # UR AUTO: 0 /LPF — SIGNIFICANT CHANGE UP (ref 0–7)
IMM GRANULOCYTES NFR BLD AUTO: 0.3 % — SIGNIFICANT CHANGE UP (ref 0.1–0.3)
KETONES UR-MCNC: NEGATIVE — SIGNIFICANT CHANGE UP
LACTATE BLDV-MCNC: 1.8 MMOL/L — HIGH (ref 0.5–1.6)
LACTATE SERPL-SCNC: 2.8 MMOL/L — HIGH (ref 0.7–2)
LEUKOCYTE ESTERASE UR-ACNC: NEGATIVE — SIGNIFICANT CHANGE UP
LYMPHOCYTES # BLD AUTO: 0.72 K/UL — LOW (ref 1.2–3.4)
LYMPHOCYTES # BLD AUTO: 11.7 % — LOW (ref 20.5–51.1)
MCHC RBC-ENTMCNC: 30.1 PG — SIGNIFICANT CHANGE UP (ref 27–31)
MCHC RBC-ENTMCNC: 33.7 G/DL — SIGNIFICANT CHANGE UP (ref 32–37)
MCV RBC AUTO: 89.2 FL — SIGNIFICANT CHANGE UP (ref 81–99)
MONOCYTES # BLD AUTO: 0.59 K/UL — SIGNIFICANT CHANGE UP (ref 0.1–0.6)
MONOCYTES NFR BLD AUTO: 9.6 % — HIGH (ref 1.7–9.3)
NEUTROPHILS # BLD AUTO: 4.8 K/UL — SIGNIFICANT CHANGE UP (ref 1.4–6.5)
NEUTROPHILS NFR BLD AUTO: 77.8 % — HIGH (ref 42.2–75.2)
NITRITE UR-MCNC: NEGATIVE — SIGNIFICANT CHANGE UP
NRBC # BLD: 0 /100 WBCS — SIGNIFICANT CHANGE UP (ref 0–0)
PCO2 BLDV: 43 MMHG — SIGNIFICANT CHANGE UP (ref 41–51)
PH BLDV: 7.39 — SIGNIFICANT CHANGE UP (ref 7.26–7.43)
PH UR: 6 — SIGNIFICANT CHANGE UP (ref 5–8)
PLATELET # BLD AUTO: 274 K/UL — SIGNIFICANT CHANGE UP (ref 130–400)
PO2 BLDV: 30 MMHG — SIGNIFICANT CHANGE UP (ref 20–40)
POTASSIUM BLDV-SCNC: 4 MMOL/L — SIGNIFICANT CHANGE UP (ref 3.3–5.6)
POTASSIUM SERPL-MCNC: 4.6 MMOL/L — SIGNIFICANT CHANGE UP (ref 3.5–5)
POTASSIUM SERPL-SCNC: 4.6 MMOL/L — SIGNIFICANT CHANGE UP (ref 3.5–5)
PROT SERPL-MCNC: 8.1 G/DL — HIGH (ref 6–8)
PROT UR-MCNC: SIGNIFICANT CHANGE UP
RBC # BLD: 4.72 M/UL — SIGNIFICANT CHANGE UP (ref 4.2–5.4)
RBC # FLD: 13.2 % — SIGNIFICANT CHANGE UP (ref 11.5–14.5)
RBC CASTS # UR COMP ASSIST: 6 /HPF — HIGH (ref 0–4)
SAO2 % BLDV: 60 % — SIGNIFICANT CHANGE UP
SODIUM SERPL-SCNC: 136 MMOL/L — SIGNIFICANT CHANGE UP (ref 135–146)
SP GR SPEC: 1.02 — SIGNIFICANT CHANGE UP (ref 1.01–1.02)
UROBILINOGEN FLD QL: SIGNIFICANT CHANGE UP
WBC # BLD: 6.17 K/UL — SIGNIFICANT CHANGE UP (ref 4.8–10.8)
WBC # FLD AUTO: 6.17 K/UL — SIGNIFICANT CHANGE UP (ref 4.8–10.8)
WBC UR QL: 6 /HPF — HIGH (ref 0–5)

## 2020-01-28 PROCEDURE — 99285 EMERGENCY DEPT VISIT HI MDM: CPT

## 2020-01-28 PROCEDURE — 71045 X-RAY EXAM CHEST 1 VIEW: CPT | Mod: 26

## 2020-01-28 PROCEDURE — 74177 CT ABD & PELVIS W/CONTRAST: CPT | Mod: 26

## 2020-01-28 RX ORDER — SODIUM CHLORIDE 9 MG/ML
1700 INJECTION, SOLUTION INTRAVENOUS ONCE
Refills: 0 | Status: COMPLETED | OUTPATIENT
Start: 2020-01-28 | End: 2020-01-28

## 2020-01-28 RX ORDER — SODIUM CHLORIDE 9 MG/ML
1000 INJECTION, SOLUTION INTRAVENOUS ONCE
Refills: 0 | Status: COMPLETED | OUTPATIENT
Start: 2020-01-28 | End: 2020-01-28

## 2020-01-28 RX ORDER — ACETAMINOPHEN 500 MG
975 TABLET ORAL ONCE
Refills: 0 | Status: COMPLETED | OUTPATIENT
Start: 2020-01-28 | End: 2020-01-28

## 2020-01-28 RX ORDER — ONDANSETRON 8 MG/1
4 TABLET, FILM COATED ORAL ONCE
Refills: 0 | Status: COMPLETED | OUTPATIENT
Start: 2020-01-28 | End: 2020-01-28

## 2020-01-28 RX ORDER — KETOROLAC TROMETHAMINE 30 MG/ML
15 SYRINGE (ML) INJECTION ONCE
Refills: 0 | Status: DISCONTINUED | OUTPATIENT
Start: 2020-01-28 | End: 2020-01-28

## 2020-01-28 RX ORDER — CEFPODOXIME PROXETIL 100 MG
100 TABLET ORAL ONCE
Refills: 0 | Status: COMPLETED | OUTPATIENT
Start: 2020-01-28 | End: 2020-01-28

## 2020-01-28 RX ORDER — CEFPODOXIME PROXETIL 100 MG
1 TABLET ORAL
Qty: 14 | Refills: 0
Start: 2020-01-28 | End: 2020-02-03

## 2020-01-28 RX ADMIN — SODIUM CHLORIDE 1700 MILLILITER(S): 9 INJECTION, SOLUTION INTRAVENOUS at 19:10

## 2020-01-28 RX ADMIN — Medication 100 MILLIGRAM(S): at 22:28

## 2020-01-28 RX ADMIN — ONDANSETRON 4 MILLIGRAM(S): 8 TABLET, FILM COATED ORAL at 18:21

## 2020-01-28 RX ADMIN — Medication 15 MILLIGRAM(S): at 18:21

## 2020-01-28 RX ADMIN — Medication 975 MILLIGRAM(S): at 18:21

## 2020-01-28 RX ADMIN — SODIUM CHLORIDE 1000 MILLILITER(S): 9 INJECTION, SOLUTION INTRAVENOUS at 18:06

## 2020-01-28 RX ADMIN — SODIUM CHLORIDE 1000 MILLILITER(S): 9 INJECTION, SOLUTION INTRAVENOUS at 19:04

## 2020-01-28 NOTE — ED ADULT NURSE NOTE - OBJECTIVE STATEMENT
Pt AOx4, ambulatory, h/o UTI, c/o lower back pain, n/v/f, dizziness since yesterday. Pt denies CP, SOB, HA, dysuria, hematuria, recent traveling. No neuro deficit noted.

## 2020-01-28 NOTE — ED PROVIDER NOTE - PHYSICAL EXAMINATION
VITAL SIGNS: I have reviewed nursing notes and confirm.  CONSTITUTIONAL: Well-developed; well-nourished; in no acute distress.  SKIN: Skin exam is warm and dry, no acute rash.  HEAD: Normocephalic; atraumatic.  EYES: PERRL, EOM intact; conjunctiva and sclera clear.  ENT: No nasal discharge; airway clear.  NECK: Supple; non tender.  CARD: S1, S2 normal; no murmurs, gallops, or rubs. tachy 100s reg rhythm.  RESP: No wheezes, rales or rhonchi.  ABD: Normal bowel sounds; soft; non-distended; non-tender; no hepatosplenomegaly; no rgr.  BACK: +bl lumbar ttp no midline ttp, no CVAT  EXT: Normal ROM. No clubbing, cyanosis or edema. DPI.  NEURO: Alert, oriented. Grossly unremarkable. No focal deficits.  PSYCH: Cooperative, appropriate.

## 2020-01-28 NOTE — ED PROVIDER NOTE - PATIENT PORTAL LINK FT
You can access the FollowMyHealth Patient Portal offered by [facility] by registering at the following website: [email or web address]. By joining [facility]’s FollowMyHealth portal, you will also be able to view your health information using other applications (apps) compatible with our system.

## 2020-01-28 NOTE — ED PROVIDER NOTE - NSFOLLOWUPCLINICS_GEN_ALL_ED_FT
[facility]  Medicine  [address], [place]   Phone: [phone]  Fax:   Follow Up Time: 7-10 Days

## 2020-01-28 NOTE — ED ADULT NURSE NOTE - NSIMPLEMENTINTERV_GEN_ALL_ED
Implemented All Universal Safety Interventions:  [place] to call system. Call bell, personal items and telephone within reach. Instruct patient to call for assistance. Room bathroom lighting operational. Non-slip footwear when patient is off stretcher. Physically safe environment: no spills, clutter or unnecessary equipment. Stretcher in lowest position, wheels locked, appropriate side rails in place.

## 2020-01-28 NOTE — ED PROVIDER NOTE - NS ED ROS FT
Constitutional: see hpi  Eyes:  No visual changes, eye pain or discharge.  ENMT:  No hearing changes, pain, no sore throat or runny nose, no difficulty swallowing  Cardiac:  No chest pain, SOB or edema. No chest pain with exertion.  Respiratory:  No cough or respiratory distress. No hemoptysis. No history of asthma or RAD.  GI:  see hpi  :  No dysuria, frequency or burning.  MS:  +myalgias, no muscle weakness, joint pain. +LBP  Neuro:  No headache or weakness.  No LOC.  Skin:  No skin rash.   Endocrine: No history of thyroid disease or diabetes.

## 2020-01-28 NOTE — ED PROVIDER NOTE - NSFOLLOWUPINSTRUCTIONS_ED_ALL_ED_FT
Urinary Tract Infection    A urinary tract infection (UTI) is an infection of any part of the urinary tract, which includes the kidneys, ureters, bladder, and urethra. Risk factors include ignoring your need to urinate, wiping back to front if female, being an uncircumcised male, and having diabetes or a weak immune system. Symptoms include frequent urination, pain or burning with urination, foul smelling urine, cloudy urine, pain in the lower abdomen, blood in the urine, and fever. If you were prescribed an antibiotic medicine, take it as told by your health care provider. Do not stop taking the antibiotic even if you start to feel better.    SEEK IMMEDIATE MEDICAL CARE IF YOU HAVE THE FOLLOWING SYMPTOMS: severe back or abdominal pain, inability to keep fluids or medicine down, dizziness/lightheadedness, or a change in mental status.    Cold Sore    A cold sore (fever blister) is a skin infection caused by the herpes simplex virus (HSV-1). HSV-1 is closely related to the virus that causes genital herpes (HSV-2), but they are not the same even though both viruses can cause oral and genital infections. Cold sores are small, fluid-filled sores inside of the mouth or on the lips, gums, nose, chin, cheeks, or fingers.     The herpes simplex virus can be easily passed (contagious) to other people through close personal contact, such as kissing or sharing personal items. The virus can also spread to other parts of the body, such as the eyes or genitals. Cold sores are contagious until the sores crust over completely. They often heal within 2 weeks.     Once a person is infected, the herpes simplex virus remains permanently in the body. Therefore, there is no cure for cold sores, and they often recur when a person is tired, stressed, sick, or gets too much sun. Additional factors that can cause a recurrence include hormone changes in menstruation or pregnancy, certain drugs, and cold weather.     CAUSES  Cold sores are caused by the herpes simplex virus. The virus is spread from person to person through close contact, such as through kissing, touching the affected area, or sharing personal items such as lip balm, razors, or eating utensils.     SYMPTOMS  The first infection may not cause symptoms. If symptoms develop, the symptoms often go through different stages. Here is how a cold sore develops:     Tingling, itching, or burning is felt 1–2 days before the outbreak.    Fluid-filled blisters appear on the lips, inside the mouth, nose, or on the cheeks.    The blisters start to ooze clear fluid.    The blisters dry up and a yellow crust appears in its place.    The crust falls off.       Symptoms depend on whether it is the initial outbreak or a recurrence. Some other symptoms with the first outbreak may include:     Fever.    Sore throat.    Headache.    Muscle aches.    Swollen neck glands.      DIAGNOSIS  A diagnosis is often made based on your symptoms and looking at the sores. Sometimes, a sore may be swabbed and then examined in the lab to make a final diagnosis. If the sores are not present, blood tests can find the herpes simplex virus.     TREATMENT  There is no cure for cold sores and no vaccine for the herpes simplex virus. Within 2 weeks, most cold sores go away on their own without treatment. Medicines cannot make the infection go away, but medicine can help relieve some of the pain associated with the sores, can work to stop the virus from multiplying, and can also shorten healing time. Medicine may be in the form of creams, gels, pills, or a shot.     HOME CARE INSTRUCTIONS  Only take over-the-counter or prescription medicines for pain, discomfort, or fever as directed by your caregiver. Do not use aspirin.    Use a cotton-tip swab to apply creams or gels to your sores.    Do not touch the sores or pick the scabs. Wash your hands often. Do not touch your eyes without washing your hands first.    Avoid kissing, oral sex, and sharing personal items until sores heal.    Apply an ice pack on your sores for 10–15 minutes to ease any discomfort.    Avoid hot, cold, or salty foods because they may hurt your mouth. Eat a soft, bland diet to avoid irritating the sores. Use a straw to drink if you have pain when drinking out of a glass.    Keep sores clean and dry to prevent an infection of other tissues.    Avoid the sun and limit stress if these things trigger outbreaks. If sun causes cold sores, apply sunscreen on the lips before being out in the sun.       SEEK MEDICAL CARE IF:  You have a fever or persistent symptoms for more than 2–3 days.    You have a fever and your symptoms suddenly get worse.    You have pus, not clear fluid, coming from the sores.    You have redness that is spreading.    You have pain or irritation in your eye.    You get sores on your genitals.    Your sores do not heal within 2 weeks.    You have a weakened immune system.    You have frequent recurrences of cold sores.       MAKE SURE YOU:  Understand these instructions.   Will watch your condition.  Will get help right away if you are not doing well or get worse.    ADDITIONAL NOTES AND INSTRUCTIONS    Please follow up with your Primary MD in 24-48 hr.  Seek immediate medical care for any new/worsening signs or symptoms.

## 2020-01-28 NOTE — ED PROVIDER NOTE - CLINICAL SUMMARY MEDICAL DECISION MAKING FREE TEXT BOX
viral sx, lbp, similar sx w/previous dx of pyelonephritis, incidental cold sore - +bacteruria, cxr clear, ct ap with chronic L spine findings however no evidence of pyelo - LA from 2.8-> <2 after ivf, abx given for bacteruria, offered antivirals for cold sore but pt declined  - all results d/w pt & copies given, strict return precautions discussed, Rx for abx, [facility] f/u

## 2020-01-28 NOTE — ED PROVIDER NOTE - CARE PLAN
Principal Discharge DX:	UTI (urinary tract infection)  Secondary Diagnosis:	Fever  Secondary Diagnosis:	Cold sore

## 2020-01-28 NOTE — ED PROVIDER NOTE - OBJECTIVE STATEMENT
39y f no pmh p/w f/c, myalgias, nausea, 1 episode nbnb vomiting and lbp x 2d. Rpts similar sx during previous episode of pyelonephritis requiring hosp admission in Oct 2019. Denies ha, neck pain or stiffness, sore throat, congestion, cp/sob, cough, diarrhea, abd pain, urinary sx, rash. Incidentally has a healing cold sore under L nare x 1 week. No PMD.

## 2020-01-29 PROBLEM — Z78.9 OTHER SPECIFIED HEALTH STATUS: Chronic | Status: ACTIVE | Noted: 2019-10-09

## 2020-02-02 LAB
CULTURE RESULTS: SIGNIFICANT CHANGE UP
CULTURE RESULTS: SIGNIFICANT CHANGE UP
SPECIMEN SOURCE: SIGNIFICANT CHANGE UP
SPECIMEN SOURCE: SIGNIFICANT CHANGE UP

## 2024-12-12 NOTE — ED PROCEDURE NOTE - ATTENDING CONTRIBUTION TO CARE
I was present for the key portions of the procedure and available as supervisor for the entire procedure as documented. You can access the FollowMyHealth Patient Portal offered by [facility] by registering at the following website: [email or web address]. By joining [employer]’s FollowMyHealth portal, you will also be able to view your health information using other applications (apps) compatible with our system.